# Patient Record
Sex: FEMALE | Race: WHITE | NOT HISPANIC OR LATINO | Employment: FULL TIME | URBAN - NONMETROPOLITAN AREA
[De-identification: names, ages, dates, MRNs, and addresses within clinical notes are randomized per-mention and may not be internally consistent; named-entity substitution may affect disease eponyms.]

---

## 2023-08-06 LAB
SARS-COV-2 RESULT: NORMAL
SARS-COV-2 RESULT: NOT DETECTED

## 2024-05-14 ENCOUNTER — OFFICE VISIT (OUTPATIENT)
Dept: URGENT CARE | Facility: CLINIC | Age: 24
End: 2024-05-14
Payer: COMMERCIAL

## 2024-05-14 VITALS
OXYGEN SATURATION: 98 % | HEART RATE: 62 BPM | SYSTOLIC BLOOD PRESSURE: 102 MMHG | DIASTOLIC BLOOD PRESSURE: 70 MMHG | TEMPERATURE: 98.6 F | RESPIRATION RATE: 16 BRPM | HEIGHT: 60 IN

## 2024-05-14 DIAGNOSIS — J02.9 ACUTE PHARYNGITIS, UNSPECIFIED ETIOLOGY: Primary | ICD-10-CM

## 2024-05-14 DIAGNOSIS — J03.90: ICD-10-CM

## 2024-05-14 LAB — POC RAPID STREP: NEGATIVE

## 2024-05-14 PROCEDURE — 87880 STREP A ASSAY W/OPTIC: CPT | Mod: QW | Performed by: PHYSICIAN ASSISTANT

## 2024-05-14 PROCEDURE — 99212 OFFICE O/P EST SF 10 MIN: CPT | Performed by: PHYSICIAN ASSISTANT

## 2024-05-14 RX ORDER — VALACYCLOVIR HYDROCHLORIDE 1 G/1
1000 TABLET, FILM COATED ORAL 3 TIMES DAILY
Qty: 21 TABLET | Refills: 0 | Status: SHIPPED | OUTPATIENT
Start: 2024-05-14 | End: 2024-05-21

## 2024-05-14 ASSESSMENT — VISUAL ACUITY: OU: 1

## 2024-05-14 NOTE — PROGRESS NOTES
[facility]IA ANTONIA NOTE:      Name: Danii Cedeño, 23 y.o.    CSN:[id number]   MRN:[id number]    PCP: No primary care provider on file.    ALL:  No Known Allergies    History:    Chief Complaint: Sore Throat (Sore throat x 5 days )    Encounter Date: 5/14/2024  10:10    HPI: The history was obtained from the patient. Danii is a 23 y.o. female, who presents with a chief complaint of Sore Throat (Sore throat x 5 days )     Three days ago, patient began experiencing a sore throat. Patient expresses she had no trouble eating with pain. OTC medications did not help her.     Patient denies fever, headaches, sore throat, rashes, cough, abdominal pain, or ear pain.     Patient denies knowledge of being around any sick individuals.     No significant PMH, medications taken daily, or allergies.    PMHx:    No past medical history on file.           Current Outpatient Medications   Medication Sig Dispense Refill    valACYclovir (Valtrex) 1 gram tablet Take 1 tablet (1,000 mg) by mouth 3 times a day for 7 days. 21 tablet 0     No current facility-administered medications for this visit.         PMSx:  No past surgical history on file.    Fam Hx: No family history on file.    SOC. Hx:     Social History     Socioeconomic History    Marital status: Single     Spouse name: Not on file    Number of children: Not on file    Years of education: Not on file    Highest education level: Not on file   Occupational History    Not on file   Tobacco Use    Smoking status: Not on file    Smokeless tobacco: Not on file   Substance and Sexual Activity    Alcohol use: Not on file    Drug use: Not on file    Sexual activity: Not on file   Other Topics Concern    Not on file   Social History Narrative    Not on file     Social Determinants of Health     Financial Resource Strain: Not on file   Food Insecurity: Not on file   Transportation Needs: Not on file   Physical Activity: Not on file   Stress: Not on file   Social  Connections: Not on file   Intimate Partner Violence: Not on file   Housing Stability: Not on file         Vitals:    05/14/24 0958   BP: 102/70   Pulse: 62   Resp: 16   Temp: 37 °C (98.6 °F)   SpO2: 98%                Physical Exam  Constitutional:       Appearance: Normal appearance. She is not ill-appearing.   HENT:      Head: Normocephalic and atraumatic.      Right Ear: Tympanic membrane, ear canal and external ear normal.      Left Ear: Tympanic membrane, ear canal and external ear normal.      Nose: Nose normal. No congestion or rhinorrhea.      Mouth/Throat:      Mouth: Mucous membranes are moist.      Tongue: No lesions.      Pharynx: Oropharyngeal exudate and posterior oropharyngeal erythema present. No uvula swelling.     Eyes:      General: Lids are normal. Vision grossly intact.      Extraocular Movements: Extraocular movements intact.   Cardiovascular:      Rate and Rhythm: Normal rate and regular rhythm.      Pulses: Normal pulses.      Heart sounds: Normal heart sounds.   Pulmonary:      Effort: Pulmonary effort is normal. No accessory muscle usage or respiratory distress.      Breath sounds: Normal breath sounds.   Abdominal:      General: There is no distension.      Palpations: There is no hepatomegaly or splenomegaly.      Tenderness: There is no abdominal tenderness. There is no guarding.   Musculoskeletal:      Cervical back: No edema.   Lymphadenopathy:      Head:      Right side of head: No submental, submandibular, tonsillar, preauricular, posterior auricular or occipital adenopathy.      Left side of head: No submental, submandibular, tonsillar, preauricular, posterior auricular or occipital adenopathy.      Cervical: No cervical adenopathy.   Skin:     General: Skin is warm.      Findings: No erythema or rash.   Neurological:      Mental Status: She is alert and oriented to person, place, and time.   Psychiatric:         Attention and Perception: Attention normal.         Mood and Affect:  Mood normal.         Speech: Speech normal.         Behavior: Behavior normal. Behavior is cooperative.         LABORATORY @ RADIOLOGICAL IMAGING (if done):     Results for orders placed or performed in visit on 05/14/24 (from the past 24 hour(s))   POCT rapid strep A manually resulted   Result Value Ref Range    POC Rapid Strep Negative Negative       ____________________________________________________________________    I did personally review Danii's past medical history, surgical history, social history, as well as family history (when relevant).  In this case, I also oversaw the her drug management by reviewing her medication list, allergy list, as well as the medications that I prescribed during the UC course and/or recommended as an out-patient (including possible OTC medications such as acetaminophen, NSAIDs , etc).    After reviewing the items above, I did look at previous medical documentation, such as recent hospitalizations, office visits, and/or recent consultations with PCP/specialist.                          SDOH:   Another factor that I considered in Danii's care was her Social Determinants of Health (SDOH). During this UC encounter, she did not have social determinants of health. Those SDOH influencing Danii's care are: none      UC COURSE/MEDICAL DECISION MAKING:    Danii is a 23 y.o., who presents with a working diagnosis of   1. Acute pharyngitis, unspecified etiology    2. Ulcerative tonsillitis     with a differential to include:     Mononucleosis less likely because no lymphadenopathy, myalgias, fever, or abdominal tenderness or splenomegaly on exam.     Group A strep pharyngitis is likely at this time because most concerning symptom for patient is sore throat and on exam, the pharynx appears erythematous with unilateral exudates present on left side. Group A Strep RT-PCR testing completed and negative. Results relayed to patient.     Viral etiology considered because of erythematous pharynx  with unilateral exudates with negative strep test.    Viral pharyngitis  Valacyclovir 1g sent to pharmacy  Call or return to office with any questions or concerns, or if symptoms worsen or do not improve  Practice proper hand hygiene and avoid sharing drinks due to contagious nature of illness      Note initiated by:  NUVIA Eng, [facility]    Supervised by  Aniket Donald PA-C   Advanced Practice Provider  [facility]    I was present with the PA student who participated in the documentation of this note. I have personally seen and re-examined the patient and performed the medical decision-making components (assessment and plan of care). I have reviewed the PA student documentation and verified the findings in the note as written with additions or exceptions as stated in the body of this note.    Aniket Donald PA-C